# Patient Record
Sex: MALE | Race: ASIAN | NOT HISPANIC OR LATINO | Employment: OTHER | URBAN - METROPOLITAN AREA
[De-identification: names, ages, dates, MRNs, and addresses within clinical notes are randomized per-mention and may not be internally consistent; named-entity substitution may affect disease eponyms.]

---

## 2023-01-25 ENCOUNTER — OFFICE VISIT (OUTPATIENT)
Dept: NEUROLOGY | Facility: CLINIC | Age: 48
End: 2023-01-25
Payer: MEDICARE

## 2023-01-25 VITALS
WEIGHT: 130 LBS | HEIGHT: 61 IN | OXYGEN SATURATION: 95 % | BODY MASS INDEX: 24.55 KG/M2 | SYSTOLIC BLOOD PRESSURE: 128 MMHG | DIASTOLIC BLOOD PRESSURE: 78 MMHG | HEART RATE: 92 BPM

## 2023-01-25 DIAGNOSIS — R56.9 PARTIAL SEIZURE WITH IMPAIRED CONSCIOUSNESS: ICD-10-CM

## 2023-01-25 DIAGNOSIS — R41.89 PARTIAL SEIZURE WITH IMPAIRED CONSCIOUSNESS: ICD-10-CM

## 2023-01-25 DIAGNOSIS — R41.89 IMPAIRED COGNITIVE ABILITY: Primary | ICD-10-CM

## 2023-01-25 DIAGNOSIS — F95.1 CHRONIC MOTOR TIC: ICD-10-CM

## 2023-01-25 PROCEDURE — 99204 OFFICE O/P NEW MOD 45 MIN: CPT | Performed by: PSYCHIATRY & NEUROLOGY

## 2023-01-25 RX ORDER — ARIPIPRAZOLE 5 MG/1
5 TABLET ORAL
COMMUNITY
Start: 2023-01-03

## 2023-01-25 RX ORDER — FLUOXETINE HYDROCHLORIDE 40 MG/1
1 CAPSULE ORAL DAILY
COMMUNITY
Start: 2023-01-05
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
## 2023-04-03 ENCOUNTER — TELEPHONE (OUTPATIENT)
Dept: NEUROLOGY | Facility: CLINIC | Age: 48
End: 2023-04-03
Payer: MEDICARE

## 2023-04-03 NOTE — TELEPHONE ENCOUNTER
Regarding:   Contact: [phone]  ----- Message from Guille Bowman MD sent at 4/3/2023  8:48 AM EDT -----  Dr Harley will address when he returns     ----- Message from Jose Bauer to Neal Harley MD sent at 3/28/2023  1:49 PM -----   Just sending you a reminder as asked. Help with guardianship. Thanks       ----- Message -----       From:Jose Bauer       Sent:3/22/2023  5:51 PM EDT         To:Patient Medical Advice Request Message List    Subject:    Yes will do thanks!      ----- Message -----       From:Neal Harley       Sent:3/22/2023  5:33 PM EDT         To:Jose Bauer    Subject:    Sorry for the delay in response.  Can you please send me another reminder on Monday?  I am looking for some more details to help you with this situation.      ----- Message -----       From:Jose Bauer       Sent:3/20/2023  7:18 PM EDT         To:Neal Harley    Subject:    When we saw Dr. Harley he said he would have a  reach out to help guide us with becoming Jose's guardian. I haven't heard anything about that yet. Is there someone I should call? Thank you  Yahaira Bauer

## 2023-04-03 NOTE — TELEPHONE ENCOUNTER
Spoke to patient and informed them Dr. Harley is still taking a look into this and will follow up with them on Monday when he returns.

## 2023-04-05 ENCOUNTER — HOSPITAL ENCOUNTER (OUTPATIENT)
Dept: NEUROLOGY | Facility: HOSPITAL | Age: 48
Discharge: HOME | End: 2023-04-05
Admitting: PSYCHIATRY & NEUROLOGY
Payer: MEDICARE

## 2023-04-05 DIAGNOSIS — R56.9 PARTIAL SEIZURE WITH IMPAIRED CONSCIOUSNESS: ICD-10-CM

## 2023-04-05 DIAGNOSIS — R41.89 PARTIAL SEIZURE WITH IMPAIRED CONSCIOUSNESS: ICD-10-CM

## 2023-04-05 PROCEDURE — 95812 EEG 41-60 MINUTES: CPT

## 2023-04-05 PROCEDURE — 95812 EEG 41-60 MINUTES: CPT | Performed by: PSYCHIATRY & NEUROLOGY

## 2023-04-14 ENCOUNTER — PATIENT MESSAGE (OUTPATIENT)
Dept: NEUROLOGY | Facility: CLINIC | Age: 48
End: 2023-04-14
Payer: MEDICARE

## 2023-04-14 NOTE — TELEPHONE ENCOUNTER
From: Jose Bauer  To: Neal Harley  Sent: 4/14/2023 10:42 AM EDT  Subject: EEG results    I was curious what Jose's EEG results were?

## 2023-08-15 ENCOUNTER — OFFICE VISIT (OUTPATIENT)
Dept: NEUROLOGY | Facility: CLINIC | Age: 48
End: 2023-08-15
Payer: MEDICARE

## 2023-08-15 VITALS
HEART RATE: 99 BPM | OXYGEN SATURATION: 93 % | WEIGHT: 130 LBS | BODY MASS INDEX: 24.55 KG/M2 | DIASTOLIC BLOOD PRESSURE: 78 MMHG | SYSTOLIC BLOOD PRESSURE: 118 MMHG | HEIGHT: 61 IN

## 2023-08-15 DIAGNOSIS — Z86.73 HISTORY OF STROKE: ICD-10-CM

## 2023-08-15 DIAGNOSIS — F95.1 CHRONIC MOTOR TIC: ICD-10-CM

## 2023-08-15 DIAGNOSIS — R41.89 IMPAIRED COGNITIVE ABILITY: Primary | ICD-10-CM

## 2023-08-15 PROCEDURE — 99214 OFFICE O/P EST MOD 30 MIN: CPT | Performed by: PSYCHIATRY & NEUROLOGY
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
## 2023-10-04 ENCOUNTER — OFFICE VISIT (OUTPATIENT)
Dept: PHYSICAL THERAPY | Facility: CLINIC | Age: 48
End: 2023-10-04
Payer: MEDICARE

## 2023-10-04 DIAGNOSIS — R26.89 BALANCE DISORDER: ICD-10-CM

## 2023-10-04 DIAGNOSIS — R41.841 COGNITIVE COMMUNICATION DEFICIT: Primary | ICD-10-CM

## 2023-10-04 DIAGNOSIS — R41.89 IMPAIRED COGNITIVE ABILITY: ICD-10-CM

## 2023-10-04 PROCEDURE — 1159F MED LIST DOCD IN RCRD: CPT | Performed by: SPEECH-LANGUAGE PATHOLOGIST

## 2023-10-04 PROCEDURE — 1160F RVW MEDS BY RX/DR IN RCRD: CPT | Performed by: SPEECH-LANGUAGE PATHOLOGIST

## 2023-10-04 PROCEDURE — 96125 COGNITIVE TEST BY HC PRO: CPT | Performed by: SPEECH-LANGUAGE PATHOLOGIST

## 2023-10-04 NOTE — PROGRESS NOTES
Outpatient Speech Language Pathology   Adult Speech Language Cognitive Initial Evaluation  [address]       Patient Name: Jose Bauer  : 1975  MRN: [id number]  Today's Date: 10/4/2023        Visit Date: 10/04/2023   There is no problem list on file for this patient.       No past medical history on file.     No past surgical history on file.      Visit Dx:    ICD-10-CM ICD-9-CM   1. Cognitive communication deficit  R41.841 799.52   2. Impaired cognitive ability  R41.89 799.59   3. Balance disorder  R26.89 781.99            OP SLP Assessment/Plan -          SLP Assessment    Functional Problems Speech Language- Adult/Cognition  -RB    Impact on Function: Adult Speech Language/Cognition Restrictions in personal and social life;Difficulty sequencing thoughts to express complex messages;Difficulty sequencing or problem solving to complete ADLs;Lack of insight or awareness of deficits, safety issues;Difficulty participating in avocational activities;Decreased recall of personal information and medical history;Trouble learning or remembering new information;Poor attention to task;Poor judgment  -RB    Clinical Impression: Speech Language-Adult/Congnition Moderate-Severe:;Cognitive Communication Impairment  -RB    Functional Problems Comment Pt's deficits impact his abiltiy to participate in social and avocational activities. Deficits restric pt from independently managing daily tasks. Deficits impact pt's ability to learn new information.  -RB    Clinical Impression Comments Pt would benefit from skilled ST  -RB    Please refer to paper survey for additional self-reported information Yes  -RB    Please refer to items scanned into chart for additional diagnostic informaiton and handouts as provided by clinician Yes  -RB    SLP Diagnosis Moderate-severe cognitive impairment  -RB    Prognosis Good (comment)  -RB    Patient/caregiver participated in establishment of treatment plan and goals Yes  -RB     Patient would benefit from skilled therapy intervention Yes  -RB       SLP Plan    Frequency 1x week  -RB    Duration 12 weeks  -RB    Planned CPT's? SLP INDIVIDUAL SPEECH THERAPY: 47531  -RB    Expected Duration of Therapy Session (SLP Eval) 45  -RB    Plan Comments Initiate POC  -RB              User Key  (r) = Recorded By, (t) = Taken By, (c) = Cosigned By      Initials Name Provider Type    RB Zenia Wyman, SLP Speech and Language Pathologist                     SLP [facility] Evaluation - 10/04/23 1500          General Information    Barriers to Rehab none identified  -RB    Patient's Goals for Discharge functional cognition  -RB    Family Goals for Discharge functional cognition  -RB    Standardized Assessment Used RBANS  -RB       Oral Motor Structure and Function    Oral Motor Structure and Function WFL  -RB    Dentition Assessment natural, present and adequate  -RB    Mucosal Quality moist, healthy  -RB       Cognitive Assessment Intervention- SLP    Cognitive Function (Cognition) moderate impairment;severe impairment  -RB    Orientation Status (Cognition) awareness of basic personal information;person;place;situation;WFL  -RB    Memory (Cognitive) simple;immediate;short-term;long-term;delayed;moderate impairment;severe impairment  -RB    Attention (Cognitive) moderate impairment;severe impairment;sustained;alternating;attention to detail  -RB    Thought Organization (Cognitive) moderate impairment;severe impairment;concrete divergent  -RB    Reasoning (Cognitive) moderate impairment;simple  -RB    Problem Solving (Cognitive) moderate impairment;simple  -RB    Executive Function (Cognition) time management;judgement;home management activities;complex organization;moderate impairment;severe impairment  -RB    Pragmatics (Communication) mild impairment   Per neuro and sister in law report, pt was noted to have lived with his mother previously where he recieved limited social interaction. -RB    Right Hemisphere  Function WFL  -RB    Cognition, Comment Pt exhibits a moderate-severe cognitive impairment. Per pt reports and evaluation results pt presented with moderate-severe deficits with memory, attention, thought organization, reasoning, problem solving, and executive functioning.   Pt and his sister-in-law note that severity of memory deficits fluctuate -RB       Standardized Tests    Cognitive/Memory Tests RBANS: Repeatable Battery for the Assessment of Neuropsychological Status  -RB       RBANS- Repeatable Battery for the Assessment of Neuropsychological Status    Immediate Memory Index Score 73  -RB    Immediate Memory Qualitative Description borderline  -RB    Language Index Score 64  -RB    Language Qualitative Description borderline;extremely low  -RB    Attention Index Score 64  -RB    Attention Qualitative Description borderline;extremely low  -RB    Delayed Memory Index Score 97  -RB    Delayed Memory Qualitative Description average   Pt's sister in law noted that this fluctuates -RB    RBANS Comments Pt presents with a moderate-severe cognitive impairment. raw score of 19/20 for figure copy.  -RB              User Key  (r) = Recorded By, (t) = Taken By, (c) = Cosigned By      Initials Name Provider Type    RB Zenia Wyman SLP Speech and Language Pathologist                          Row Name 10/04/23 1500       Rehab Evaluation    Document Type evaluation  -RB    Total Evaluation Minutes, SLP 45  -RB    Subjective Information no complaints  -RB    Patient Observations alert;cooperative;agree to therapy  -RB    Patient Effort good  -RB    Symptoms Noted During/After Treatment none  -RB       General Information    Patient Profile Reviewed yes  -RB    Pertinent History Of Current Problem PMH: anxiety and depression. Per neurology and sister-in-law note, pt previously lived with his mother where he recieved limited social interaction. Pt has now moved in with his brother and sister-in-law, they report he currently  attends [employer] 5 days a week, where he socializes and participates in group activities such as softball. Pt is accompanied to today's evaluation by his sister-in-law. They report deficits with short term memory, long term memory and recall, they noted that severity of deficits fluctuate. Pt reports deficits with focus, attention, executive functioning, processing, word-finding, time management, and self awareness. Pt also reported balance deficits and is considering being evaluated by physical therapy. Pt does not report any fine motor deficits. Pt's sister-in-law manages pt's medicine and medical appointments. Pt has daily responsibilites and chores such as cleaning designated areas of the house. Pt's goal of therapy is to gain independence with daily tasks. Pt utilizes bilateral hearing aids and corrective lenses with bifocals. Pt notes he does not wear his corrective lenses often.  -RB    Precautions/Limitations, Vision --  Pt has bifocals but reports to not wear them often.  -RB    Precautions/Limitations, Hearing hearing aid, bilaterally  -RB    Prior Level of Function-Communication cognitive-linguistic impairment  -RB    Plans/Goals Discussed with patient and family;agreed upon  -RB    Patient's Goals for Discharge take care of myself at home  Pt wants to gain more independence at home  -RB    Family Goals for Discharge patient able to provide self-care independently  -RB       Oral Musculature and Cranial Nerve Assessment    Oral Motor General Assessment WFL  -RB              User Key  (r) = Recorded By, (t) = Taken By, (c) = Cosigned By      Initials Name Provider Type    Zenia Moore, SLP Speech and Language Pathologist                            ACTIVITY  ACCURACY ASSISTANCE NEEDED   LTGs:   Pt will be able to remember information needed to  function on avocational and vocational tasks 90% of the time no-min cues     Pt and family will implement compensatory strategies to maximize  patient’s memory function so patient can continue to participate in daily activities 90% of the time no- min cues            STG:  Pt’s memory skills will be enhanced as reported by patient by utilizing internal memory strategies to recall up to 5 pieces of information after a 5 minute delay no -min cues           STG:   Pt’s memory skills will be enhanced as reported by patient using external memory aides 90-95% of the time no cues                 STG:   Pt will demonstrate improved ability to recall and summarize information by listening/reading information and  answering questions/ summarzing 90-95% of the time no- min cues                Pt will improve executive functioning by using self-monitoring strategies and metacognitive during functional tasks 90-95% accuracy no cues       STG:  Pt will utilize word finding strategies with no cues at 95%      Pt will improve attention skills by sustaining focus to selective target/task when presented with competing stimuli or in a distracting environment in order to complete a task 90% no cues           Certification: 10/4/23-1/3/24         OP SLP Education       Row Name 10/04/23 1500       Education    Barriers to Learning No barriers identified  -RB    Education Provided Described results of evaluation;Family/caregivers expressed understanding of evaluation;Patient expressed understanding of evaluation;Patient participated in establishing goals and treatment plan;Family/caregivers participated in establishing goals and treatment plan;Patient demonstrated recommended strategies;Family/caregivers demonstrated recommended strategies;Patient requires further education on strategies, risks;Family/caregivers require further education on strategies, risks  -RB    Assessed Learning needs;Learning motivation;Learning preferences;Learning readiness  -RB    Learning Motivation Strong  -RB    Learning Method Explanation;Demonstration;Teach back;Written materials  -RB    Teaching  Response Verbalized understanding;Demonstrated understanding;Reinforcement needed  -RB    Education Comments Cognitive/communication strategies, physical activity and social activity and executive functioning  -RB              User Key  (r) = Recorded By, (t) = Taken By, (c) = Cosigned By      Initials Name Effective Dates    Zenia Moore, NATALIE 07/11/23 -                       NATALIE Kowalski  provided treatment under my direct supervision.      [facility] Speech Language Pathology   [address]  [place] [address]  Zenia Wyman MA CCC-SLP [facility] license: [id number]        PHYSICIAN: Neal Harley MD    NPI: [id number]         I certify that the therapy services are furnished while this patient is under my care.  The services outlined above are required by this patient, and will be reviewed every 90 days.                PHYSICIAN:                                         DATE:      Please sign and return via fax to [phone].   Thank you,   [facility] Speech[facility]rapy.   10/4/2023

## 2023-10-11 ENCOUNTER — TREATMENT (OUTPATIENT)
Dept: PHYSICAL THERAPY | Facility: CLINIC | Age: 48
End: 2023-10-11
Payer: MEDICARE

## 2023-10-11 DIAGNOSIS — R41.841 COGNITIVE COMMUNICATION DEFICIT: Primary | ICD-10-CM

## 2023-10-11 PROCEDURE — 92507 TX SP LANG VOICE COMM INDIV: CPT | Performed by: SPEECH-LANGUAGE PATHOLOGIST

## 2023-10-11 NOTE — PROGRESS NOTES
Outpatient Speech Language Pathology   Adult Speech Language Cognitive Treatment Note  [address]       Patient Name: Jose Bauer  : 1975  MRN: [id number]  Today's Date: 10/11/2023        Visit Date: 10/11/2023   There is no problem list on file for this patient.       No past medical history on file.     No past surgical history on file.      Visit Dx:    ICD-10-CM ICD-9-CM   1. Cognitive communication deficit  R41.841 799.52            OP SLP Assessment/Plan -          SLP Assessment    Functional Problems Speech Language- Adult/Cognition  -RB    Impact on Function: Adult Speech Language/Cognition Restrictions in personal and social life;Difficulty sequencing thoughts to express complex messages;Difficulty sequencing or problem solving to complete ADLs;Lack of insight or awareness of deficits, safety issues;Difficulty participating in avocational activities;Decreased recall of personal information and medical history;Trouble learning or remembering new information;Poor attention to task;Poor judgment  -RB    Clinical Impression: Speech Language-Adult/Congnition Moderate-Severe:;Cognitive Communication Impairment  -RB    Functional Problems Comment Pt's deficits impact his abiltiy to participate in social and avocational activities. Deficits restric pt from independently managing daily tasks. Deficits impact pt's ability to learn new information.  -RB    Clinical Impression Comments Receptive to strategies,  good family support -RB    Please refer to paper survey for additional self-reported information Yes  -RB    Please refer to items scanned into chart for additional diagnostic informaiton and handouts as provided by clinician Yes  -RB    SLP Diagnosis Moderate-severe cognitive impairment  -RB    Prognosis Good (comment)  -RB    Patient/caregiver participated in establishment of treatment plan and goals Yes  -RB    Patient would benefit from skilled therapy intervention Yes  -RB       SLP Plan     Frequency 1x week  -RB    Duration 11 weeks  -RB    Planned CPT's? SLP INDIVIDUAL SPEECH THERAPY: 76965  -RB    Expected Duration of Therapy Session (SLP [place]) 45  -RB    Plan Comments Continue POC, calendar management -RB              User Key  (r) = Recorded By, (t) = Taken By, (c) = Cosigned By      Initials Name Provider Type    Zenia Moore SLP Speech and Language Pathologist                    Pain:  Subjective: Pt reports trying journal and to do lists       ACTIVITY  ACCURACY ASSISTANCE NEEDED   LTGs:   Pt will be able to remember information needed to  function on avocational and vocational tasks 90% of the time no-min cues     Pt and family will implement compensatory strategies to maximize patient's memory function so patient can continue to participate in daily activities 90% of the time no- min cues         Assessed daily task management   Medicine management: 100%, min cues  Insurance card: 90%, mid-mod  Pill label: 90%, min-mod  Budgetin%   No-mod cues   STG:  Pt's memory skills will be enhanced as reported by patient by utilizing internal memory strategies to recall up to 5 pieces of information after a 5 minute delay no -min cues        Not targeted     STG:   Pt's memory skills will be enhanced as reported by patient using external memory aides 90-95% of the time no cues              Assessed daily task   Medicine management: 100%   No-min cues   STG:   Pt will demonstrate improved ability to recall and summarize information by listening/reading information and  answering questions/ summarzing 90-95% of the time no- min cues                Pt will improve executive functioning by using self-monitoring strategies and metacognitive during functional tasks 90-95% accuracy no cues    Not targeted                              Modeled self-awareness, modeled note taking strategies, options and prompts     STG:  Pt will utilize word finding strategies with no cues at 95%      Pt will improve  attention skills by sustaining focus to selective target/task when presented with competing stimuli or in a distracting environment in order to complete a task 90% no cues    Not targeted          Not targeted         Certification: 10/4/23-1/3/24         OP SLP Education       Row Name        Education    Barriers to Learning No barriers identified  -RB    Education Provided Described results of evaluation;Family/caregivers expressed understanding of evaluation;Patient expressed understanding of evaluation;Patient participated in establishing goals and treatment plan;Family/caregivers participated in establishing goals and treatment plan;Patient demonstrated recommended strategies;Family/caregivers demonstrated recommended strategies;Patient requires further education on strategies, risks;Family/caregivers require further education on strategies, risks  -RB    Assessed Learning needs;Learning motivation;Learning preferences;Learning readiness  -RB    Learning Motivation Strong  -RB    Learning Method Explanation;Demonstration;Teach back;Written materials  -RB    Teaching Response Verbalized understanding;Demonstrated understanding;Reinforcement needed  -RB    Education Comments Self-awareness, note taking strategies, note taking options, note taking prompts -RB              User Key  (r) = Recorded By, (t) = Taken By, (c) = Cosigned By      Initials Name Effective Dates    Zenia Moore SLP 07/11/23 -                       NATALIE Kowalski  provided treatment under my direct supervision.    Zenia Wyman MA CCC-SLP [facility]  [place] license: [id number]

## 2023-10-18 ENCOUNTER — TREATMENT (OUTPATIENT)
Dept: PHYSICAL THERAPY | Facility: CLINIC | Age: 48
End: 2023-10-18
Payer: MEDICARE

## 2023-10-18 DIAGNOSIS — R41.841 COGNITIVE COMMUNICATION DEFICIT: Primary | ICD-10-CM

## 2023-10-18 PROCEDURE — 92507 TX SP LANG VOICE COMM INDIV: CPT | Performed by: SPEECH-LANGUAGE PATHOLOGIST

## 2023-10-18 NOTE — PROGRESS NOTES
Outpatient Speech Language Pathology   Adult Speech Language Cognitive Treatment Note  [address]       Patient Name: Jose Bauer  : 1975  MRN: [id number]  Today's Date: 10/18/2023        Visit Date: 10/18/2023   There is no problem list on file for this patient.       No past medical history on file.     No past surgical history on file.      Visit Dx:    ICD-10-CM ICD-9-CM   1. Cognitive communication deficit  R41.841 799.52            OP SLP Assessment/Plan -          SLP Assessment    Functional Problems Speech Language- Adult/Cognition  -RB    Impact on Function: Adult Speech Language/Cognition Restrictions in personal and social life;Difficulty sequencing thoughts to express complex messages;Difficulty sequencing or problem solving to complete ADLs;Lack of insight or awareness of deficits, safety issues;Difficulty participating in avocational activities;Decreased recall of personal information and medical history;Trouble learning or remembering new information;Poor attention to task;Poor judgment  -RB    Clinical Impression: Speech Language-Adult/Congnition Moderate-Severe:;Cognitive Communication Impairment  -RB    Functional Problems Comment Pt's deficits impact his abiltiy to participate in social and avocational activities. Deficits restric pt from independently managing daily tasks. Deficits impact pt's ability to learn new information.  -RB    Clinical Impression Comments Receptive to tx and strategies, pt following HEP, good family support, using strategies -RB    Please refer to paper survey for additional self-reported information Yes  -RB    Please refer to items scanned into chart for additional diagnostic informaiton and handouts as provided by clinician Yes  -RB    SLP Diagnosis Moderate-severe cognitive impairment  -RB    Prognosis Good (comment)  -RB    Patient/caregiver participated in establishment of treatment plan and goals Yes  -RB    Patient would benefit from skilled  therapy intervention Yes  -RB       SLP Plan    Frequency 1x week  -RB    Duration 10 weeks  -RB    Planned CPT's? 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        ACTIVITY  ACCURACY ASSISTANCE NEEDED   LTGs:   Pt will be able to remember information needed to  function on avocational and vocational tasks 90% of the time no-min cues     Pt and family will implement compensatory strategies to maximize patient’s memory function so patient can continue to participate in daily activities 90% of the time no- min cues         Assessed daily task management   Calendar management: 100%,    No-mod cues   STG:  Pt’s memory skills will be enhanced as reported by patient by utilizing internal memory strategies to recall up to 5 pieces of information after a 5 minute delay no -min cues        Modeled/targeted association   Name recall: 4/4, 5 min delay   No cues   STG:   Pt’s memory skills will be enhanced as reported by patient using external memory aides 90-95% of the time no cues              Assessed daily tasks  Modeled calendar supports    Calendar management: 100%   No-mod cues   STG:   Pt will demonstrate improved ability to recall and summarize information by listening/reading information and  answering questions/ summarzing 90-95% of the time no- min cues                Pt will improve executive functioning by using self-monitoring strategies and metacognitive during functional tasks 90-95% accuracy no cues    Targeted auditory and visual recall tasks                            Modeled goal management strategies   Article recall: 90%   No-mod cues    STG:  Pt will utilize word finding strategies with no cues at 95%      Pt will improve attention skills by sustaining focus to selective target/task when presented with competing stimuli or in a distracting environment in order to complete a task 90% no cues    Not targeted          Not targeted         Certification: 10/4/23-1/3/24         OP SLP Education       Row Name        Education    Barriers to Learning No barriers identified  -RB    Education Provided Described results of evaluation;Family/caregivers expressed understanding of evaluation;Patient expressed understanding of evaluation;Patient participated in establishing goals and treatment plan;Family/caregivers participated in establishing goals and treatment plan;Patient demonstrated recommended strategies;Family/caregivers demonstrated recommended strategies;Patient requires further education on strategies, risks;Family/caregivers require further education on strategies, risks  -RB    Assessed Learning needs;Learning motivation;Learning preferences;Learning readiness  -RB    Learning Motivation Strong  -RB    Learning Method Explanation;Demonstration;Teach back;Written materials  -RB    Teaching Response Verbalized understanding;Demonstrated understanding;Reinforcement needed  -RB    Education Comments HEP: goal management strategies, association, calendar supports  -RB              User Key  (r) = Recorded By, (t) = Taken By, (c) = Cosigned By      Initials Name Effective Dates    Zenia Moore SLP 07/11/23 -                       NATALIE Kowalski  provided treatment under my direct supervision.    Zenia Wyman MA CCC-SLP [facility]  [place] license: [id number]

## 2023-11-01 ENCOUNTER — TREATMENT (OUTPATIENT)
Dept: PHYSICAL THERAPY | Facility: CLINIC | Age: 48
End: 2023-11-01
Payer: MEDICARE

## 2023-11-01 DIAGNOSIS — R26.89 BALANCE DISORDER: Primary | ICD-10-CM

## 2023-11-01 DIAGNOSIS — R41.841 COGNITIVE COMMUNICATION DEFICIT: Primary | ICD-10-CM

## 2023-11-01 PROCEDURE — 92507 TX SP LANG VOICE COMM INDIV: CPT | Performed by: SPEECH-LANGUAGE PATHOLOGIST

## 2023-11-01 PROCEDURE — 97161 PT EVAL LOW COMPLEX 20 MIN: CPT | Performed by: PHYSICAL THERAPIST

## 2023-11-01 PROCEDURE — 97110 THERAPEUTIC EXERCISES: CPT | Performed by: PHYSICAL THERAPIST

## 2023-11-01 NOTE — PROGRESS NOTES
Physical Therapy Initial Evaluation and Plan of Care     [facility]          [address]     Patient: Jose Bauer   : 1975  Diagnosis/ICD-10 Code:  Balance disorder [R26.89]  Referring practitioner: Neal Harley MD  Date of Initial Visit: 2023  Today's Date: 2023  Patient seen for 1 sessions    Subjective:     Subjective Questionnaire: FGA   miniBESTest       Subjective Evaluation    History of Present Illness  Mechanism of injury: Pt lives with TERRANCE and brother. Attends adult day center in [place]. Pt states when he plays with his nieces at the park he states his balance isnt good. At home everything is fine. Pt can walk for as long as he wants. He is going to start playing basketball and softball. He doesn't lift anything anymore.     Pain  No pain reported    Social Support  Lives in: multiple-level home (does not have to do steps often)           Objective          Strength/Myotome Testing     Left Hip   Planes of Motion   Flexion: 5  Extension: 5  Abduction: 5    Right Hip   Planes of Motion   Flexion: 5  Extension: 5  Abduction: 5    Left Knee   Flexion: 5  Extension: 5    Right Knee   Flexion: 5  Extension: 5    Left Ankle/Foot   Dorsiflexion: 5    Right Ankle/Foot   Dorsiflexion: 5    Ambulation     Comments   Normal gait pattern         PT Neuro         Assessment & Plan       Assessment  Assessment details: Patient is a 48 YOM that presents to therapy with complaints of balance. Pt is beginning to play basketball and softball with the [employer]. He does not demonstrate any LOB in clinic with balance testing with the exception of SLS and tandem walking. Patient would like a home program developed in order for him to follow easily at home. Patient will be seen for 2 visits to establish and progress program.     Goals  Plan Goals: STG (2 weeks)  1. Patient will be independent and compliant with HEP.       Plan  Frequency: 1x week  Duration  in visits: 2  Plan details: Patient will be seen 1x/wk x 2wks with treatment to include strengthening, stretching, manual therapy, neuromuscular re-education, balance, gait and endurance training.           Timed:  Manual Therapy:    0     mins  43213;  Therapeutic Exercise:    10     mins  94797;     Neuromuscular Nayely:    0    mins  36395;    Therapeutic Activity:     0     mins  86532;     Gait Trainin     mins  97102;     Electrical Stimulation:    0     mins  31579 ( );    Untimed:  Canalith Repositioning    0     mins 06200    Timed Treatment:   8   mins   Total Treatment:     40   mins    PT SIGNATURE: Tiffany Escobar, PT, DPT, MSCS, CDP, CSRS  [place] License #[id number]  DATE TREATMENT INITIATED: 2023      Medicare Initial Certification Certification Period: 2023thr  I certify that the therapy services are furnished while this patient is under my care.  The services outlined above are required by this patient, and will be reviewed every 90 days.     PHYSICIAN: Neal Harley MD  NPI: [id number]                                         DATE:     Please sign and return via fax to [phone].   Thank you,   [facility].

## 2023-11-01 NOTE — PROGRESS NOTES
Outpatient Speech Language Pathology   Adult Speech Language Cognitive Progress Note  [address]       Patient Name: Jose Bauer  : 1975  MRN: [id number]  Today's Date: 2023        Visit Date: 2023   There is no problem list on file for this patient.       No past medical history on file.     No past surgical history on file.      Visit Dx:    ICD-10-CM ICD-9-CM   1. Cognitive communication deficit  R41.841 799.52            OP SLP Assessment/Plan -          SLP Assessment    Functional Problems Speech Language- Adult/Cognition  -RB    Impact on Function: Adult Speech Language/Cognition Restrictions in personal and social life;Difficulty sequencing thoughts to express complex messages;Difficulty sequencing or problem solving to complete ADLs;Lack of insight or awareness of deficits, safety issues;Difficulty participating in avocational activities;Decreased recall of personal information and medical history;Trouble learning or remembering new information;Poor attention to task;Poor judgment  -RB    Clinical Impression: Speech Language-Adult/Congnition Moderate-Severe:;Cognitive Communication Impairment  -RB    Functional Problems Comment Pt's deficits impact his abiltiy to participate in social and avocational activities. Deficits restric pt from independently managing daily tasks. Deficits impact pt's ability to learn new information.  -RB    Clinical Impression Comments Receptive to tx and strategies, pt following HEP, good family support, using strategies, pt reports following a calendar more consistently  -RB    Please refer to paper survey for additional self-reported information Yes  -RB    Please refer to items scanned into chart for additional diagnostic informaiton and handouts as provided by clinician Yes  -RB    SLP Diagnosis Moderate-severe cognitive impairment  -RB    Prognosis Good (comment)  -RB    Patient/caregiver participated in establishment of treatment plan and goals  Yes  -RB    Patient would benefit from skilled therapy intervention Yes  -RB       SLP Plan    Frequency 1x week  -RB    Duration 9 weeks  -RB    Planned CPT's? SLP INDIVIDUAL SPEECH THERAPY: 62558  -RB    Expected Duration of Therapy Session (SLP Lenka) 45  -RB    Plan Comments Continue POC-RB              User Key  (r) = Recorded By, (t) = Taken By, (c) = Cosigned By      Initials Name Provider Type    RB Zenia Wyman, SLP Speech and Language Pathologist                    Pain: 0  Subjective: Pt reports dressing up as a ibarra for halloween.        ACTIVITY  ACCURACY ASSISTANCE NEEDED   LTGs:   Pt will be able to remember information needed to  function on avocational and vocational tasks 90% of the time no-min cues     Pt and family will implement compensatory strategies to maximize patient’s memory function so patient can continue to participate in daily activities 90% of the time no- min cues         Targeted compensatory strategies, internal and external strategies                Targeting compensatory strategies   75-80%                          75-80%   No-mod cues                          No-mod cues   STG:  Pt’s memory skills will be enhanced as reported by patient by utilizing internal memory strategies to recall up to 5 pieces of information after a 5 minute delay no -min cues        Targeted association, modeled/targeted rehearsal   Name recall: 4/4, 5 min delay  To do: 5/5, 5 min delay   No cues   STG:   Pt’s memory skills will be enhanced as reported by patient using external memory aides 90-95% of the time no cues              Targeted note taking    75%   No-mod cues   STG:   Pt will demonstrate improved ability to recall and summarize information by listening/reading information and  answering questions/ summarzing 90-95% of the time no- min cues                Pt will improve executive functioning by using self-monitoring strategies and metacognitive during functional tasks 90-95% accuracy no  cues    Targeted auditory and visual recall tasks                            Modeled organization and routines, went over calendar use    Article recall: 85%  Pod recall: 85%   No-mod cues   STG:  Pt will utilize word finding strategies with no cues at 95%      Pt will improve attention skills by sustaining focus to selective target/task when presented with competing stimuli or in a distracting environment in order to complete a task 90% no cues    Not targeted          Selective attention tasks             Recipe: 95%             No-mod cues      Certification: 10/4/23-1/3/24         OP SLP Education       Row Name        Education    Barriers to Learning No barriers identified  -RB    Education Provided Described results of evaluation;Family/caregivers expressed understanding of evaluation;Patient expressed understanding of evaluation;Patient participated in establishing goals and treatment plan;Family/caregivers participated in establishing goals and treatment plan;Patient demonstrated recommended strategies;Family/caregivers demonstrated recommended strategies;Patient requires further education on strategies, risks;Family/caregivers require further education on strategies, risks  -RB    Assessed Learning needs;Learning motivation;Learning preferences;Learning readiness  -RB    Learning Motivation Strong  -RB    Learning Method Explanation;Demonstration;Teach back;Written materials  -RB    Teaching Response Verbalized understanding;Demonstrated understanding;Reinforcement needed  -RB    Education Comments HEP: Rehearsal, organization and routines, recipe task  -RB              User Key  (r) = Recorded By, (t) = Taken By, (c) = Cosigned By      Initials Name Effective Dates    Zenia Moore SLP 07/11/23 -                       NATALIE Kowalski  provided treatment under my direct supervision.    Zenia Wyman MA CCC-SLP CBIS  [place] license: [id number]

## 2023-11-08 ENCOUNTER — TREATMENT (OUTPATIENT)
Dept: PHYSICAL THERAPY | Facility: CLINIC | Age: 48
End: 2023-11-08
Payer: MEDICARE

## 2023-11-08 DIAGNOSIS — R41.841 COGNITIVE COMMUNICATION DEFICIT: Primary | ICD-10-CM

## 2023-11-08 PROCEDURE — 92507 TX SP LANG VOICE COMM INDIV: CPT | Performed by: SPEECH-LANGUAGE PATHOLOGIST

## 2023-11-08 NOTE — PROGRESS NOTES
Outpatient Speech Language Pathology   Adult Speech Language Cognitive Treatment Note  [address]       Patient Name: Jose Bauer  : 1975  MRN: [id number]  Today's Date: 2023        Visit Date: 2023   There is no problem list on file for this patient.       No past medical history on file.     No past surgical history on file.      Visit Dx:    ICD-10-CM ICD-9-CM   1. Cognitive communication deficit  R41.841 799.52            OP SLP Assessment/Plan -          SLP Assessment    Functional Problems Speech Language- Adult/Cognition  -RB    Impact on Function: Adult Speech Language/Cognition Restrictions in personal and social life;Difficulty sequencing thoughts to express complex messages;Difficulty sequencing or problem solving to complete ADLs;Lack of insight or awareness of deficits, safety issues;Difficulty participating in avocational activities;Decreased recall of personal information and medical history;Trouble learning or remembering new information;Poor attention to task;Poor judgment  -RB    Clinical Impression: Speech Language-Adult/Congnition Moderate-Severe:;Cognitive Communication Impairment  -RB    Functional Problems Comment Pt's deficits impact his abiltiy to participate in social and avocational activities. Deficits restric pt from independently managing daily tasks. Deficits impact pt's ability to learn new information.  -RB    Clinical Impression Comments Receptive to tx and strategies, pt following HEP, good family support, using strategies, pt making gains toward goals  -RB    Please refer to paper survey for additional self-reported information Yes  -RB    Please refer to items scanned into chart for additional diagnostic informaiton and handouts as provided by clinician Yes  -RB    SLP Diagnosis Moderate-severe cognitive impairment  -RB    Prognosis Good (comment)  -RB    Patient/caregiver participated in establishment of treatment plan and goals Yes  -RB    Patient  would benefit from skilled therapy intervention Yes  -RB       SLP Plan    Frequency 1x week  -RB    Duration 8 weeks  -RB    Planned CPT's? SLP INDIVIDUAL SPEECH THERAPY: 63658  -RB    Expected Duration of Therapy Session (SLP Lenka) 45  -RB    Plan Comments Continue POC-RB              User Key  (r) = Recorded By, (t) = Taken By, (c) = Cosigned By      Initials Name Provider Type    RB Zenia Wyman, SLP Speech and Language Pathologist                    Pain: 0  Subjective: Pt reports he's been practicing basketball at his daily program.        ACTIVITY  ACCURACY ASSISTANCE NEEDED   LTGs:   Pt will be able to remember information needed to  function on avocational and vocational tasks 90% of the time no-min cues     Pt and family will implement compensatory strategies to maximize patient’s memory function so patient can continue to participate in daily activities 90% of the time no- min cues         Targeted compensatory strategies, internal and external strategies                Targeting compensatory strategies   75-80%                          75-80%   No-mod cues                          No-mod cues   STG:  Pt’s memory skills will be enhanced as reported by patient by utilizing internal memory strategies to recall up to 5 pieces of information after a 5 minute delay no -min cues        Targeted association, rehearsal, modeled/targeted visualization    Name recall: 4/4, 5 min delay  To do: 5/5, 5 min delay   No-min cues   STG:   Pt’s memory skills will be enhanced as reported by patient using external memory aides 90-95% of the time no cues              Targeted note taking    75%   No-mod cues   STG:   Pt will demonstrate improved ability to recall and summarize information by listening/reading information and  answering questions/ summarzing 90-95% of the time no- min cues                Pt will improve executive functioning by using self-monitoring strategies and metacognitive during functional tasks 90-95%  accuracy no cues    Targeted auditory and visual recall tasks                            Modeled managing and identifying distractions   Article recall: 90%  Pod recall: 80%   No-mod cues   STG:  Pt will utilize word finding strategies with no cues at 95%      Pt will improve attention skills by sustaining focus to selective target/task when presented with competing stimuli or in a distracting environment in order to complete a task 90% no cues    Not targeted          Selective attention tasks  Modeled managing and identifying distractions             Picking a hotel room task: 90%             No-mod cues      Certification: 10/4/23-1/3/24         OP SLP Education       Row Name        Education    Barriers to Learning No barriers identified  -RB    Education Provided Described results of evaluation;Family/caregivers expressed understanding of evaluation;Patient expressed understanding of evaluation;Patient participated in establishing goals and treatment plan;Family/caregivers participated in establishing goals and treatment plan;Patient demonstrated recommended strategies;Family/caregivers demonstrated recommended strategies;Patient requires further education on strategies, risks;Family/caregivers require further education on strategies, risks  -RB    Assessed Learning needs;Learning motivation;Learning preferences;Learning readiness  -RB    Learning Motivation Strong  -RB    Learning Method Explanation;Demonstration;Teach back;Written materials  -RB    Teaching Response Verbalized understanding;Demonstrated understanding;Reinforcement needed  -RB    Education Comments HEP: visualization, identifying distractions, managing distractions, self monitoring  -RB              User Key  (r) = Recorded By, (t) = Taken By, (c) = Cosigned By      Initials Name Effective Dates    Zenia Moore SLP 07/11/23 -                       NATALIE Kowalski  provided treatment under my direct  supervision.    Zenia Wyman MA CCC-SLP CBIS  [place] license: [id number]

## 2023-11-15 ENCOUNTER — TREATMENT (OUTPATIENT)
Dept: PHYSICAL THERAPY | Facility: CLINIC | Age: 48
End: 2023-11-15
Payer: MEDICARE

## 2023-11-15 DIAGNOSIS — R26.89 BALANCE DISORDER: Primary | ICD-10-CM

## 2023-11-15 DIAGNOSIS — R41.841 COGNITIVE COMMUNICATION DEFICIT: Primary | ICD-10-CM

## 2023-11-15 PROCEDURE — 97110 THERAPEUTIC EXERCISES: CPT | Performed by: PHYSICAL THERAPIST

## 2023-11-15 PROCEDURE — 97112 NEUROMUSCULAR REEDUCATION: CPT | Performed by: PHYSICAL THERAPIST

## 2023-11-15 PROCEDURE — 92507 TX SP LANG VOICE COMM INDIV: CPT | Performed by: SPEECH-LANGUAGE PATHOLOGIST

## 2023-11-15 NOTE — PROGRESS NOTES
Outpatient Speech Language Pathology   Adult Speech Language Cognitive Treatment Note  [address]       Patient Name: Jose Bauer  : 1975  MRN: [id number]  Today's Date: 11/15/2023        Visit Date: 11/15/2023   There is no problem list on file for this patient.       No past medical history on file.     No past surgical history on file.      Visit Dx:    ICD-10-CM ICD-9-CM   1. Cognitive communication deficit  R41.841 799.52            OP SLP Assessment/Plan -          SLP Assessment    Functional Problems Speech Language- Adult/Cognition  -RB    Impact on Function: Adult Speech Language/Cognition Restrictions in personal and social life;Difficulty sequencing thoughts to express complex messages;Difficulty sequencing or problem solving to complete ADLs;Lack of insight or awareness of deficits, safety issues;Difficulty participating in avocational activities;Decreased recall of personal information and medical history;Trouble learning or remembering new information;Poor attention to task;Poor judgment  -RB    Clinical Impression: Speech Language-Adult/Congnition Moderate-Severe:;Cognitive Communication Impairment  -RB    Functional Problems Comment Pt's deficits impact his abiltiy to participate in social and avocational activities. Deficits restric pt from independently managing daily tasks. Deficits impact pt's ability to learn new information.  -RB    Clinical Impression Comments Receptive to tx and strategies, pt following HEP, pt participating in his day program and activities  -RB    Please refer to paper survey for additional self-reported information Yes  -RB    Please refer to items scanned into chart for additional diagnostic informaiton and handouts as provided by clinician Yes  -RB    SLP Diagnosis Moderate-severe cognitive impairment  -RB    Prognosis Good (comment)  -RB    Patient/caregiver participated in establishment of treatment plan and goals Yes  -RB    Patient would benefit  from skilled therapy intervention Yes  -RB       SLP Plan    Frequency 1x week  -RB    Duration 7 weeks  -RB    Planned CPT's? SLP INDIVIDUAL SPEECH THERAPY: 04528  -RB    Expected Duration of Therapy Session (SLP Lenka) 45  -RB    Plan Comments Continue POC-RB              User Key  (r) = Recorded By, (t) = Taken By, (c) = Cosigned By      Initials Name Provider Type    RB Zenia Wyman SLP Speech and Language Pathologist                    Pain: 0  Subjective: Pt reports doing crafts at his program this week.        ACTIVITY  ACCURACY ASSISTANCE NEEDED   LTGs:   Pt will be able to remember information needed to  function on avocational and vocational tasks 90% of the time no-min cues     Pt and family will implement compensatory strategies to maximize patient’s memory function so patient can continue to participate in daily activities 90% of the time no- min cues         Targeted compensatory strategies, internal and external strategies                Targeting compensatory strategies   75-80%                          75-80%   No-mod cues                          No-mod cues   STG:  Pt’s memory skills will be enhanced as reported by patient by utilizing internal memory strategies to recall up to 5 pieces of information after a 5 minute delay no -min cues        Targeted association, rehearsal, visualization, modeled/targeted grouping    Name recall: 4/4, 5 min delay  To do: 4/5, 5 min delay, no-min cues    No cues   STG:   Pt’s memory skills will be enhanced as reported by patient using external memory aides 90-95% of the time no cues              Targeted note taking    75%   No-min cues   STG:   Pt will demonstrate improved ability to recall and summarize information by listening/reading information and  answering questions/ summarzing 90-95% of the time no- min cues                Pt will improve executive functioning by using self-monitoring strategies and metacognitive during functional tasks 90-95% accuracy  no cues    Targeted auditory and visual recall tasks                            Modeled SWAPS problem solving strategy   Article recall: 85%  Pod recall: 80%   No-mod cues   STG:  Pt will utilize word finding strategies with no cues at 95%      Pt will improve attention skills by sustaining focus to selective target/task when presented with competing stimuli or in a distracting environment in order to complete a task 90% no cues    Not targeted          Selective attention tasks               Recipe task: 90%             No-mod cues      Certification: 10/4/23-1/3/24         OP SLP Education       Row Name        Education    Barriers to Learning No barriers identified  -RB    Education Provided Described results of evaluation;Family/caregivers expressed understanding of evaluation;Patient expressed understanding of evaluation;Patient participated in establishing goals and treatment plan;Family/caregivers participated in establishing goals and treatment plan;Patient demonstrated recommended strategies;Family/caregivers demonstrated recommended strategies;Patient requires further education on strategies, risks;Family/caregivers require further education on strategies, risks  -RB    Assessed Learning needs;Learning motivation;Learning preferences;Learning readiness  -RB    Learning Motivation Strong  -RB    Learning Method Explanation;Demonstration;Teach back;Written materials  -RB    Teaching Response Verbalized understanding;Demonstrated understanding;Reinforcement needed  -RB    Education Comments HEP: grouping, SWAPS, baseball attention task  -RB              User Key  (r) = Recorded By, (t) = Taken By, (c) = Cosigned By      Initials Name Effective Dates    Zenia Moore SLP 07/11/23 -                       NATALIE Kowalski  provided treatment under my direct supervision.    Zenia Wyman MA CCC-SLP CBIS  [place] license: [id number]

## 2023-11-15 NOTE — PROGRESS NOTES
Physical Therapy Daily Note  Visit: 2  Date of Initial Visit: Type: THERAPY  Noted: 2023    Patient: Jose Bauer   : 1975  Diagnosis/ICD-10 Code:  Balance disorder [R26.89]  Referring practitioner: Neal Harley MD  Date of Initial Visit: Type: THERAPY  Noted: 2023  Today's Date: 11/15/2023  Patient seen for 2 sessions    Subjective:   Patient reports: he is well.   Pain: 0/10  Clinical Progress: improved  Home Program Compliance: Yes  Treatment has included: therapeutic exercise and neuromuscular re-education    Objective   See Exercise, Manual, and Modality Logs for complete treatment.    PT Neuro          Assessment & Plan       Assessment  Assessment details: Patient demonstrates good performance of strength and balance based exercises. Patient will continue to perform tandem based exercises that include forward and backward walking. Continue to progress over one more visit.     Plan  Plan details: Patient to continue with PT services to improve gait, balance, strength, transfers and overall functional mobility.          Timed:  Manual Therapy:            0     mins  73894;  Therapeutic Exercise:    23    mins  81820;     Neuromuscular Nayely:    15    mins  80076;    Therapeutic Activity:      0     mins  59890;     Gait Trainin    mins  29108;     Electrical Stimulation:    0    mins  81099 ( );     Untimed:  Canalith Repositioning techniques _0_ [id number]      Timed Treatment:   38   mins   Total Treatment:     38   mins      Tiffany Escobar, PT, DPT, MSCS, CDP, CSRS  [place] License #: [id number]  Physical Therapist

## 2023-11-22 ENCOUNTER — TREATMENT (OUTPATIENT)
Dept: PHYSICAL THERAPY | Facility: CLINIC | Age: 48
End: 2023-11-22
Payer: MEDICARE

## 2023-11-22 DIAGNOSIS — R41.841 COGNITIVE COMMUNICATION DEFICIT: Primary | ICD-10-CM

## 2023-11-22 DIAGNOSIS — R26.89 BALANCE DISORDER: Primary | ICD-10-CM

## 2023-11-22 PROCEDURE — 97110 THERAPEUTIC EXERCISES: CPT | Performed by: PHYSICAL THERAPIST

## 2023-11-22 PROCEDURE — 92507 TX SP LANG VOICE COMM INDIV: CPT | Performed by: SPEECH-LANGUAGE PATHOLOGIST

## 2023-11-22 PROCEDURE — 97112 NEUROMUSCULAR REEDUCATION: CPT | Performed by: PHYSICAL THERAPIST

## 2023-11-22 NOTE — PROGRESS NOTES
Physical Therapy Daily Note/Discharge Summary  Visit: 3  Date of Initial Visit: Type: THERAPY  Noted: 2023    Patient: Jose Bauer   : 1975  Diagnosis/ICD-10 Code:  Balance disorder [R26.89]  Referring practitioner: Neal Harley MD  Date of Initial Visit: Type: THERAPY  Noted: 2023  Today's Date: 2023  Patient seen for 3 sessions    Subjective:   Patient reports: he is tired. Still playing basketball  Pain: 0/10  Clinical Progress: improved  Home Program Compliance: Yes  Treatment has included: therapeutic exercise and neuromuscular re-education    Objective   See Exercise, Manual, and Modality Logs for complete treatment.    PT Neuro          Assessment & Plan       Assessment  Assessment details: Patient has been administered standing balance exercises for performance at home. He reports he is performing on occasion but not daily. He continues to play basketball, run and jump without any issue. Patient will be discharged to independent management of program.     Plan  Plan details: Patient will be discharged to independent management of program.         Timed:  Manual Therapy:            0     mins  72284;  Therapeutic Exercise:    8    mins  29693;     Neuromuscular Nayely:    30    mins  45725;    Therapeutic Activity:      0     mins  78993;     Gait Trainin    mins  68668;     Electrical Stimulation:    0    mins  24447 ( );     Untimed:  Canalith Repositioning techniques _0_ 82982      Timed Treatment:   38   mins   Total Treatment:     38   mins      Tiffany Escobar PT, DPT, MSCS, CDP, CSRS  [place] License #: [id number]  Physical Therapist

## 2023-11-22 NOTE — PROGRESS NOTES
Outpatient Speech Language Pathology   Adult Speech Language Cognitive Progress Note  [address] [place] [address]       Patient Name: Jose Bauer  : 1975  MRN: [id number]  Today's Date: 2023        Visit Date: 2023   There is no problem list on file for this patient.       No past medical history on file.     No past surgical history on file.      Visit Dx:    ICD-10-CM ICD-9-CM   1. Cognitive communication deficit  R41.841 799.52            OP SLP Assessment/Plan -          SLP Assessment    Functional Problems Speech Language- Adult/Cognition  -RB    Impact on Function: Adult Speech Language/Cognition Restrictions in personal and social life;Difficulty sequencing thoughts to express complex messages;Difficulty sequencing or problem solving to complete ADLs;Lack of insight or awareness of deficits, safety issues;Difficulty participating in avocational activities;Decreased recall of personal information and medical history;Trouble learning or remembering new information;Poor attention to task;Poor judgment  -RB    Clinical Impression: Speech Language-Adult/Congnition Moderate-Severe:;Cognitive Communication Impairment  -RB    Functional Problems Comment Pt's deficits impact his abiltiy to participate in social and avocational activities. Deficits restric pt from independently managing daily tasks. Deficits impact pt's ability to learn new information.  -RB    Clinical Impression Comments Receptive to tx and strategies, pt following HEP, pt reporting difficulty making strategies a habit  -RB    Please refer to paper survey for additional self-reported information Yes  -RB    Please refer to items scanned into chart for additional diagnostic informaiton and handouts as provided by clinician Yes  -RB    SLP Diagnosis Moderate-severe cognitive impairment  -RB    Prognosis Good (comment)  -RB    Patient/caregiver participated in establishment of treatment plan and goals Yes  -RB    Patient would benefit  from skilled therapy intervention Yes  -RB       SLP Plan    Frequency 1x week  -RB    Duration 6 weeks  -RB    Planned CPT's? SLP INDIVIDUAL SPEECH THERAPY: 86526  -RB    Expected Duration of Therapy Session (SLP Lenka) 45  -RB    Plan Comments Continue POC-RB              User Key  (r) = Recorded By, (t) = Taken By, (c) = Cosigned By      Initials Name Provider Type    RB Zenia Wyman, SLP Speech and Language Pathologist                    Pain: 0  Subjective: Pt reports getting ready for thanksgiving.        ACTIVITY  ACCURACY ASSISTANCE NEEDED   LTGs:   Pt will be able to remember information needed to  function on avocational and vocational tasks 90% of the time no-min cues     Pt and family will implement compensatory strategies to maximize patient’s memory function so patient can continue to participate in daily activities 90% of the time no- min cues         Targeted compensatory strategies, internal and external strategies          Targeting compensatory strategies   75-80%                  75-80%   No-mod cues                  No-mod cues   STG:  Pt’s memory skills will be enhanced as reported by patient by utilizing internal memory strategies to recall up to 5 pieces of information after a 5 minute delay no -min cues        Not targeted           STG:   Pt’s memory skills will be enhanced as reported by patient using external memory aides 90-95% of the time no cues              Discussed implementation of timers/alarms,  calendars  and prioritization lists to maintain daily hygiene        STG:   Pt will demonstrate improved ability to recall and summarize information by listening/reading information and  answering questions/ summarzing 90-95% of the time no- min cues                Pt will improve executive functioning by using self-monitoring strategies and metacognitive during functional tasks 90-95% accuracy no cues    Not targeted                               Discussed utilization of goal  management, SWAPS problem solving strategy, routines, and EM aids to meet short-term hygiene goals as working towards long term goal of gaining personal independence            STG:  Pt will utilize word finding strategies with no cues at 95%      Pt will improve attention skills by sustaining focus to selective target/task when presented with competing stimuli or in a distracting environment in order to complete a task 90% no cues    Not targeted          Not targeted                                   Certification: 10/4/23-1/3/24         OP SLP Education       Row Name        Education    Barriers to Learning No barriers identified  -RB    Education Provided Described results of evaluation;Family/caregivers expressed understanding of evaluation;Patient expressed understanding of evaluation;Patient participated in establishing goals and treatment plan;Family/caregivers participated in establishing goals and treatment plan;Patient demonstrated recommended strategies;Family/caregivers demonstrated recommended strategies;Patient requires further education on strategies, risks;Family/caregivers require further education on strategies, risks  -RB    Assessed Learning needs;Learning motivation;Learning preferences;Learning readiness  -RB    Learning Motivation Strong  -RB    Learning Method Explanation;Demonstration;Teach back;Written materials  -RB    Teaching Response Verbalized understanding;Demonstrated understanding;Reinforcement needed  -RB    Education Comments HEP: prioritization  -RB              User Key  (r) = Recorded By, (t) = Taken By, (c) = Cosigned By      Initials Name Effective Dates    Zenia Moore SLP 07/11/23 -                     NATALIE Kowalski  provided treatment under my direct supervision.    Zenia Wyman MA CCC-SLP [facility]  [place] license: [id number]

## 2023-12-13 ENCOUNTER — TREATMENT (OUTPATIENT)
Dept: PHYSICAL THERAPY | Facility: CLINIC | Age: 48
End: 2023-12-13
Payer: MEDICARE

## 2023-12-13 DIAGNOSIS — R41.841 COGNITIVE COMMUNICATION DEFICIT: Primary | ICD-10-CM

## 2023-12-13 PROCEDURE — 92507 TX SP LANG VOICE COMM INDIV: CPT | Performed by: SPEECH-LANGUAGE PATHOLOGIST

## 2023-12-13 NOTE — PROGRESS NOTES
Outpatient Speech Language Pathology   Adult Speech Language Cognitive Treatment Note  [address] [place]       Patient Name: Jose Bauer  : 1975  MRN: [id number]  Today's Date: 2023        Visit Date: 2023   There is no problem list on file for this patient.       No past medical history on file.     No past surgical history on file.      Visit Dx:    ICD-10-CM ICD-9-CM   1. Cognitive communication deficit  R41.841 799.52            OP SLP Assessment/Plan -          SLP Assessment    Functional Problems Speech Language- Adult/Cognition  -RB    Impact on Function: Adult Speech Language/Cognition Restrictions in personal and social life;Difficulty sequencing thoughts to express complex messages;Difficulty sequencing or problem solving to complete ADLs;Lack of insight or awareness of deficits, safety issues;Difficulty participating in avocational activities;Decreased recall of personal information and medical history;Trouble learning or remembering new information;Poor attention to task;Poor judgment  -RB    Clinical Impression: Speech Language-Adult/Congnition Moderate:;Cognitive Communication Impairment  -RB    Functional Problems Comment Pt's deficits impact his abiltiy to participate in social and avocational activities. Deficits restric pt from independently managing daily tasks. Deficits impact pt's ability to learn new information.  -RB    Clinical Impression Comments Receptive to tx and strategies, pt following HEP per pt report, pt requires less cues in sessions  -RB    Please refer to paper survey for additional self-reported information Yes  -RB    Please refer to items scanned into chart for additional diagnostic informaiton and handouts as provided by clinician Yes  -RB    SLP Diagnosis Moderate-severe cognitive impairment  -RB    Prognosis Good (comment)  -RB    Patient/caregiver participated in establishment of treatment plan and goals Yes  -RB    Patient would benefit from  skilled therapy intervention Yes  -RB       SLP Plan    Frequency 1x week  -RB    Duration 5 weeks  -RB    Planned CPT's? SLP INDIVIDUAL SPEECH THERAPY: 09112  -RB    Expected Duration of Therapy Session (SLP Lenka) 45  -RB    Plan Comments Continue POC, due to insurance pt will be OON at start of 2024-RB              User Key  (r) = Recorded By, (t) = Taken By, (c) = Cosigned By      Initials Name Provider Type    RB Zenia Wyman SLP Speech and Language Pathologist                    Pain: 0  Subjective: Pt reports a good Thanksgiving and that his niece had surgery        ACTIVITY  ACCURACY ASSISTANCE NEEDED   LTGs:   Pt will be able to remember information needed to  function on avocational and vocational tasks 90% of the time no-min cues     Pt and family will implement compensatory strategies to maximize patient’s memory function so patient can continue to participate in daily activities 90% of the time no- min cues         Targeted compensatory strategies, internal and external strategies          Targeting compensatory strategies   80%                  80%   No-min cues                  No-min cues   STG:  Pt’s memory skills will be enhanced as reported by patient by utilizing internal memory strategies to recall up to 5 pieces of information after a 5 minute delay no -min cues        Targeted association, grouping, visualization, rehearsal, elaboration     Name recall: 4/4 5 min delay  Grocery list: 5/5 5 min delay   No cues   STG:   Pt’s memory skills will be enhanced as reported by patient using external memory aides 90-95% of the time no cues              Discussed implementation of timers/alarms,  calendars  and prioritization lists to maintain daily hygiene, pt reports this is going well at home        STG:   Pt will demonstrate improved ability to recall and summarize information by listening/reading information and  answering questions/ summarzing 90-95% of the time no- min cues                Pt  will improve executive functioning by using self-monitoring strategies and metacognitive during functional tasks 90-95% accuracy no cues    Targeted visual and auditory recall tasks                              Reviewed executive functioning strategies, modeled time estimation     Article recall: 95% x2  Recipe task: 90%   No cues   STG:  Pt will utilize word finding strategies with no cues at 95%      Pt will improve attention skills by sustaining focus to selective target/task when presented with competing stimuli or in a distracting environment in order to complete a task 90% no cues    Targeted in conversation-MET          Targeted sustained and selective attention strategies-MET     95%          90%   No cues           No cues     Certification: 10/4/23-1/3/24         OP SLP Education       Row Name        Education    Barriers to Learning No barriers identified  -RB    Education Provided ;Patient participated in establishing goals and treatment plan;Family/caregivers participated in establishing goals and treatment plan;Patient demonstrated recommended strategies;Family/caregivers demonstrated recommended strategies;Patient requires further education on strategies, risks;Family/caregivers require further education on strategies, risks  -RB    Assessed Learning needs;Learning motivation;Learning preferences;Learning readiness  -RB    Learning Motivation Strong  -RB    Learning Method Explanation;Demonstration;Teach back;Written materials  -RB    Teaching Response Verbalized understanding;Demonstrated understanding;Reinforcement needed  -RB    Education Comments HEP: elaboration, time estimation  -RB              User Key  (r) = Recorded By, (t) = Taken By, (c) = Cosigned By      Initials Name Effective Dates    Zenia Moore SLP 07/11/23 -                         Zenia Wyman MA, CCC-SLP [facility]  [place] license: [id number]

## 2023-12-20 ENCOUNTER — TREATMENT (OUTPATIENT)
Dept: PHYSICAL THERAPY | Facility: CLINIC | Age: 48
End: 2023-12-20
Payer: MEDICARE

## 2023-12-20 DIAGNOSIS — R41.841 COGNITIVE COMMUNICATION DEFICIT: Primary | ICD-10-CM

## 2023-12-20 PROCEDURE — 92507 TX SP LANG VOICE COMM INDIV: CPT | Performed by: SPEECH-LANGUAGE PATHOLOGIST

## 2023-12-20 NOTE — PROGRESS NOTES
Outpatient Speech Language Pathology   Adult Speech Language Cognitive Progress Note  [address] [place]       Patient Name: Jose Bauer  : 1975  MRN: [id number]  Today's Date: 2023        Visit Date: 2023   There is no problem list on file for this patient.       No past medical history on file.     No past surgical history on file.      Visit Dx:    ICD-10-CM ICD-9-CM   1. Cognitive communication deficit  R41.841 799.52            OP SLP Assessment/Plan -          SLP Assessment    Functional Problems Speech Language- Adult/Cognition  -RB    Impact on Function: Adult Speech Language/Cognition Restrictions in personal and social life;Difficulty sequencing thoughts to express complex messages;Difficulty sequencing or problem solving to complete ADLs;Lack of insight or awareness of deficits, safety issues;Difficulty participating in avocational activities;Decreased recall of personal information and medical history;Trouble learning or remembering new information;Poor attention to task;Poor judgment  -RB    Clinical Impression: Speech Language-Adult/Congnition Moderate:;Cognitive Communication Impairment  -RB    Functional Problems Comment Pt's deficits impact his abiltiy to participate in social and avocational activities. Deficits restric pt from independently managing daily tasks. Deficits impact pt's ability to learn new information.  -RB    Clinical Impression Comments Receptive to tx and strategies, pt following HEP per pt report, pt meeting goals in sessions  -RB    Please refer to paper survey for additional self-reported information Yes  -RB    Please refer to items scanned into chart for additional diagnostic informaiton and handouts as provided by clinician Yes  -RB    SLP Diagnosis Moderate-severe cognitive impairment  -RB    Prognosis Good (comment)  -RB    Patient/caregiver participated in establishment of treatment plan and goals Yes  -RB    Patient would benefit from skilled  therapy intervention Yes  -RB       SLP Plan    Frequency 1x week  -RB    Duration 4 weeks  -RB    Planned CPT's? SLP INDIVIDUAL SPEECH THERAPY: 68877  -RB    Expected Duration of Therapy Session (SLP Lenka) 45  -RB    Plan Comments Continue POC, due to insurance pt will be OON at start of 2024-RB              User Key  (r) = Recorded By, (t) = Taken By, (c) = Cosigned By      Initials Name Provider Type    RB Zenia Wyman SLP Speech and Language Pathologist                    Pain: 0  Subjective: Pt reports being ready for [employer]        ACTIVITY  ACCURACY ASSISTANCE NEEDED   LTGs:   Pt will be able to remember information needed to  function on avocational and vocational tasks 90% of the time no-min cues     Pt and family will implement compensatory strategies to maximize patient’s memory function so patient can continue to participate in daily activities 90% of the time no- min cues         Targeted compensatory strategies, internal and external strategies          Targeting compensatory strategies   80-85%                  80-85%   No-min cues                  No-min cues   STG:  Pt’s memory skills will be enhanced as reported by patient by utilizing internal memory strategies to recall up to 5 pieces of information after a 5 minute delay no -min cues        Targeted association, grouping, visualization, rehearsal, elaboration- MET  x1    Name recall: 4/4 5 min delay   list: 5/5 5 min delay   No-min cues   STG:   Pt’s memory skills will be enhanced as reported by patient using external memory aides 90-95% of the time no cues              Discussed implementation of timers/alarms,  calendars and prioritization lists to maintain daily hygiene and daily tasks 85% Min cues   STG:   Pt will demonstrate improved ability to recall and summarize information by listening/reading information and  answering questions/ summarzing 90-95% of the time no- min cues                Pt will improve executive functioning by  using self-monitoring strategies and metacognitive during functional tasks 90-95% accuracy no cues    Targeted visual and auditory recall tasks                              Reviewed executive functioning strategies      Article recall: 95% x2  Menu task: 95%  5 min podcast: 85%  Recipe task: 95%                              80-85%   No-min cues   STG:  Pt will utilize word finding strategies with no cues at 95%      Pt will improve attention skills by sustaining focus to selective target/task when presented with competing stimuli or in a distracting environment in order to complete a task 90% no cues    -MET          MET             Certification: 10/4/23-1/3/24         OP SLP Education       Row Name        Education    Barriers to Learning No barriers identified  -RB    Education Provided ;Patient participated in establishing goals and treatment plan;Family/caregivers participated in establishing goals and treatment plan;Patient demonstrated recommended strategies;Family/caregivers demonstrated recommended strategies;Patient requires further education on strategies, risks;Family/caregivers require further education on strategies, risks  -RB    Assessed Learning needs;Learning motivation;Learning preferences;Learning readiness  -RB    Learning Motivation Strong  -RB    Learning Method Explanation;Demonstration;Teach back;Written materials  -RB    Teaching Response Verbalized understanding;Demonstrated understanding;Reinforcement needed  -RB    Education Comments HEP: divided and selective attention  -RB              User Key  (r) = Recorded By, (t) = Taken By, (c) = Cosigned By      Initials Name Effective Dates    Zenia Moore SLP 07/11/23 -                         Zenia Wyman MA, CCC-SLP [facility]  [place] license: [id number]